# Patient Record
Sex: FEMALE | Race: WHITE | NOT HISPANIC OR LATINO | Employment: FULL TIME | URBAN - METROPOLITAN AREA
[De-identification: names, ages, dates, MRNs, and addresses within clinical notes are randomized per-mention and may not be internally consistent; named-entity substitution may affect disease eponyms.]

---

## 2022-10-08 ENCOUNTER — OFFICE VISIT (OUTPATIENT)
Dept: FAMILY MEDICINE | Facility: CLINIC | Age: 42
End: 2022-10-08
Payer: COMMERCIAL

## 2022-10-08 VITALS
HEART RATE: 64 BPM | TEMPERATURE: 98.4 F | OXYGEN SATURATION: 100 % | SYSTOLIC BLOOD PRESSURE: 109 MMHG | DIASTOLIC BLOOD PRESSURE: 92 MMHG

## 2022-10-08 DIAGNOSIS — R20.2 NUMBNESS AND TINGLING IN RIGHT HAND: ICD-10-CM

## 2022-10-08 DIAGNOSIS — R20.0 NUMBNESS AND TINGLING IN RIGHT HAND: ICD-10-CM

## 2022-10-08 DIAGNOSIS — R20.9 SENSATION OF COLD IN FINGER: Primary | ICD-10-CM

## 2022-10-08 DIAGNOSIS — R53.83 OTHER FATIGUE: ICD-10-CM

## 2022-10-08 DIAGNOSIS — L81.9 DISCOLORATION OF SKIN OF FINGER: ICD-10-CM

## 2022-10-08 LAB
ERYTHROCYTE [DISTWIDTH] IN BLOOD BY AUTOMATED COUNT: 12.2 % (ref 10–15)
FERRITIN SERPL-MCNC: 199 NG/ML (ref 6–175)
HCT VFR BLD AUTO: 41.6 % (ref 35–47)
HGB BLD-MCNC: 14.5 G/DL (ref 11.7–15.7)
MCH RBC QN AUTO: 33 PG (ref 26.5–33)
MCHC RBC AUTO-ENTMCNC: 34.9 G/DL (ref 31.5–36.5)
MCV RBC AUTO: 95 FL (ref 78–100)
PLATELET # BLD AUTO: 225 10E3/UL (ref 150–450)
RBC # BLD AUTO: 4.4 10E6/UL (ref 3.8–5.2)
WBC # BLD AUTO: 7.3 10E3/UL (ref 4–11)

## 2022-10-08 PROCEDURE — 36415 COLL VENOUS BLD VENIPUNCTURE: CPT | Performed by: FAMILY MEDICINE

## 2022-10-08 PROCEDURE — 99203 OFFICE O/P NEW LOW 30 MIN: CPT | Performed by: FAMILY MEDICINE

## 2022-10-08 PROCEDURE — 85027 COMPLETE CBC AUTOMATED: CPT | Performed by: FAMILY MEDICINE

## 2022-10-08 PROCEDURE — 82728 ASSAY OF FERRITIN: CPT | Performed by: FAMILY MEDICINE

## 2022-10-08 PROCEDURE — 82306 VITAMIN D 25 HYDROXY: CPT | Performed by: FAMILY MEDICINE

## 2022-10-08 RX ORDER — LEVOTHYROXINE SODIUM 100 UG/1
TABLET ORAL
COMMUNITY
Start: 2022-09-13

## 2022-10-08 RX ORDER — BUPROPION HYDROCHLORIDE 150 MG/1
150 TABLET ORAL
COMMUNITY
Start: 2022-09-13

## 2022-10-08 NOTE — PROGRESS NOTES
Patient presents with:  Numbness: PAIN ON WRIST RAIDAITING UP TO ARM; HAS BEEN WEARING A BRACE TO HELP; FEELS LIKE PINS AND NEEDLE-- LOOKS LIKE TURNING BLUE AND YELLOW; FATIGUE       Clinical Decision Making:      ICD-10-CM    1. Sensation of cold in finger  R20.9 Vascular Medicine Referral     Vitamin D Deficiency     CBC with platelets     Ferritin   2. Discoloration of skin of finger  L81.9 Vascular Medicine Referral     Vitamin D Deficiency     CBC with platelets     Ferritin   3. Other fatigue  R53.83 Vascular Medicine Referral     Vitamin D Deficiency     CBC with platelets     Ferritin   4. Numbness and tingling in right hand  R20.0 Orthopedic  Referral    R20.2 Vitamin D Deficiency     CBC with platelets     Ferritin       42-year-old previously healthy except for a past medical history of hypothyroidism who is here for fingers appearing blue and cold and numbness and tingling in her right breast.  Exam shows carpal tunnel in the right wrist.  Her primary care is been having her use a brace to help with her symptoms.  We will send her to orthopedics for further evaluation and treatment.  With regards to the cold feeling and fingers appearing below no swelling was noted, I do not think any immediate imaging is needed, I will send her to vascular medicine for further evaluation for any autoimmune vascular disease. Pt agreed with plan.     There are no Patient Instructions on file for this visit.    HPI:  Alexa Soni is a 42 year old female who presents today complaining of   Chief Complaint   Patient presents with     Numbness     PAIN ON WRIST RAIDAITING UP TO ARM; HAS BEEN WEARING A BRACE TO HELP; FEELS LIKE PINS AND NEEDLE-- LOOKS LIKE TURNING BLUE AND YELLOW; FATIGUE        History obtained from the patient.    Problem List:  There are no relevant problems documented for this patient.      No past medical history on file.    Social History     Tobacco Use     Smoking status: Never Smoker      Smokeless tobacco: Not on file   Substance Use Topics     Alcohol use: Not on file       Review of Systems  Constitutional, HEENT, cardiovascular, pulmonary, gi and gu systems are negative, except as otherwise noted.    Vitals:    10/08/22 1441   BP: (!) 109/92   BP Location: Right arm   Patient Position: Chair   Cuff Size: Adult Regular   Pulse: 64   Temp: 98.4  F (36.9  C)   TempSrc: Tympanic   SpO2: 100%       Physical Exam  GENERAL: healthy, alert and no distress  MS: no gross musculoskeletal defects noted, no edema,  And Tinel's positive in the right wrist.  Fingers appear blue and cold  NEURO: Normal strength and tone, mentation intact and speech normal  PSYCH: mentation appears normal, affect normal/bright        Results:  No results found for any visits on 10/08/22.      At the end of the encounter, I discussed results, diagnosis, medications. Discussed red flags for immediate return to clinic/ER, as well as indications for follow up if no improvement. Patient understood and agreed to plan. Patient was stable for discharge.

## 2022-10-09 LAB — DEPRECATED CALCIDIOL+CALCIFEROL SERPL-MC: 42 UG/L (ref 20–75)

## 2023-02-04 ENCOUNTER — HEALTH MAINTENANCE LETTER (OUTPATIENT)
Age: 43
End: 2023-02-04

## 2023-05-14 ENCOUNTER — HEALTH MAINTENANCE LETTER (OUTPATIENT)
Age: 43
End: 2023-05-14

## 2024-03-03 ENCOUNTER — HEALTH MAINTENANCE LETTER (OUTPATIENT)
Age: 44
End: 2024-03-03

## 2024-07-21 ENCOUNTER — HEALTH MAINTENANCE LETTER (OUTPATIENT)
Age: 44
End: 2024-07-21

## 2025-08-10 ENCOUNTER — HEALTH MAINTENANCE LETTER (OUTPATIENT)
Age: 45
End: 2025-08-10